# Patient Record
Sex: MALE | Race: BLACK OR AFRICAN AMERICAN | Employment: UNEMPLOYED | URBAN - METROPOLITAN AREA
[De-identification: names, ages, dates, MRNs, and addresses within clinical notes are randomized per-mention and may not be internally consistent; named-entity substitution may affect disease eponyms.]

---

## 2023-01-18 ENCOUNTER — HOSPITAL ENCOUNTER (EMERGENCY)
Age: 38
Discharge: HOME | End: 2023-01-18
Attending: EMERGENCY MEDICINE
Payer: MEDICARE

## 2023-01-18 VITALS
RESPIRATION RATE: 20 BRPM | TEMPERATURE: 97.7 F | HEIGHT: 69 IN | DIASTOLIC BLOOD PRESSURE: 78 MMHG | BODY MASS INDEX: 28.28 KG/M2 | SYSTOLIC BLOOD PRESSURE: 144 MMHG | OXYGEN SATURATION: 98 % | HEART RATE: 72 BPM | WEIGHT: 190.92 LBS

## 2023-01-18 DIAGNOSIS — H65.01 NON-RECURRENT ACUTE SEROUS OTITIS MEDIA OF RIGHT EAR: Primary | ICD-10-CM

## 2023-01-18 PROCEDURE — 6370000000 HC RX 637 (ALT 250 FOR IP): Performed by: EMERGENCY MEDICINE

## 2023-01-18 PROCEDURE — 99283 EMERGENCY DEPT VISIT LOW MDM: CPT

## 2023-01-18 RX ORDER — AZITHROMYCIN 500 MG/1
500 TABLET, FILM COATED ORAL ONCE
Status: COMPLETED | OUTPATIENT
Start: 2023-01-18 | End: 2023-01-18

## 2023-01-18 RX ORDER — AZITHROMYCIN 500 MG/1
500 TABLET, FILM COATED ORAL DAILY
Qty: 5 TABLET | Refills: 0 | Status: SHIPPED | OUTPATIENT
Start: 2023-01-18 | End: 2023-01-23

## 2023-01-18 RX ADMIN — AZITHROMYCIN MONOHYDRATE 500 MG: 500 TABLET ORAL at 16:35

## 2023-01-18 ASSESSMENT — ENCOUNTER SYMPTOMS
VOICE CHANGE: 0
WHEEZING: 0
TROUBLE SWALLOWING: 0
SHORTNESS OF BREATH: 0

## 2023-01-18 NOTE — ED NOTES
Patient presents to ED with R ear fullness, states that he was placing cotton in his R ear to dry and drown out noise while he slept. Denies pain, but states he can barely hear.       Radha Aparicio RN  01/18/23 7886

## 2023-01-18 NOTE — ED PROVIDER NOTES
[id number] [facility]  eMERGENCY dEPARTMENT eNCOUnter      Pt Name: Tucker Mitchell  MRN: [id number]  [place] 1985  Date of evaluation: 1/18/2023  Provider: Barney Hedrick MD    [address] [place] [address]       Chief Complaint   Patient presents with    Ear Fullness         HISTORY OF PRESENT ILLNESS   (Location/Symptom, Timing/Onset, Context/Setting, Quality, Duration, Modifying Factors, Severity)  Note limiting factors. History obtained from: the patient    Tucker Mitchell is a 40 y.o. male who denies any significant past medical history reports 1 week of bilateral ear pain worse on the right than the left. Patient has any fever cough runny nose or other symptoms. Patient symptoms are moderate constant worsening denies any known aggravating or alleviating factors. Patient does report he feels like his ears are clogged and has mildly decreased hearing although hearing is not absent. HPI    Nursing Notes were reviewed. REVIEW OFSYSTEMS    (2-9 systems for level 4, 10 or more for level 5)     Review of Systems   Constitutional:  Negative for fever. HENT:  Positive for ear pain. Negative for drooling, trouble swallowing and voice change. Eyes:  Negative for visual disturbance. Respiratory:  Negative for shortness of breath and wheezing. Cardiovascular:  Negative for chest pain and palpitations. Neurological:  Negative for seizures and syncope. Psychiatric/Behavioral:  Negative for self-injury and suicidal ideas. Except as noted above the remainder of the review of systems was reviewed and negative. PAST MEDICAL HISTORY   No past medical history on file. SURGICAL HISTORY     No past surgical history on file. CURRENT MEDICATIONS       Previous Medications    No medications on file       ALLERGIES     Patient has no known allergies. FAMILY HISTORY     No family history on file.        SOCIAL HISTORY       Social History     Socioeconomic History    Marital status: Single         PHYSICAL EXAM    (up to 7 for level 4, 8 or more for level 5)     ED Triage Vitals [01/18/23 1519]   BP Temp Temp Source Heart Rate Resp SpO2 Height Weight   (!) 144/78 97.7 °F (36.5 °C) Oral 72 20 98 % 5' 9\" (1.753 m) 190 lb 14.7 oz (86.6 kg)       Physical Exam  Vitals and nursing note reviewed. Constitutional:       General: He is not in acute distress. Appearance: He is well-developed. HENT:      Head: Normocephalic and atraumatic. Right Ear: There is no impacted cerumen. Left Ear: Tympanic membrane, ear canal and external ear normal. There is no impacted cerumen. Ears:      Comments: Left external ear canal and TM normal.  Right external ear canal with mild erythema and TM bulging erythematous but no perforation. Eyes:      Conjunctiva/sclera: Conjunctivae normal.   Neck:      Vascular: No JVD. Trachea: No tracheal deviation. Cardiovascular:      Rate and Rhythm: Normal rate. Pulmonary:      Effort: Pulmonary effort is normal. No respiratory distress. Skin:     General: Skin is warm and dry. Neurological:      Mental Status: He is alert. EMERGENCY DEPARTMENT COURSE and DIFFERENTIAL DIAGNOSIS/MDM:   Vitals:    Vitals:    01/18/23 1519   BP: (!) 144/78   Pulse: 72   Resp: 20   Temp: 97.7 °F (36.5 °C)   TempSrc: Oral   SpO2: 98%   Weight: 190 lb 14.7 oz (86.6 kg)   Height: 5' 9\" (1.753 m)       Patient was given the following medications:  Medications   azithromycin (ZITHROMAX) tablet 500 mg (has no administration in time range)           CC/HPI Summary, DDx, ED Course, Reassessment, Disposition Considerations (include Tests not done, Admit vs D/C, Shared Decision Making, Pt Expectation of Test or Tx.):  I feel the patient likely has acute otitis media.  There is no evidence of perforation on exam. Ddx includes otitis externa, trauma, foreign body, herpes zoster oticus, chronic otitis media, mastoiditis however these are less consistent with history and physical and I feel the diagnosis of acute otitis media is much more likely. I considered serum labs and will get imaging such as CT at this time however do not feel this is likely. Patient reports an allergy to penicillin and therefore I prescribed him azithromycin. Patient advised to follow-up with primary care provider. Patient in agreement with plan. Strict return precautions given. The parents expressed understanding and agreement with this plan and the patient was discharged home. FINAL IMPRESSION      1. Non-recurrent acute serous otitis media of right ear          DISPOSITION/PLAN     DISPOSITION Decision To Discharge 01/18/2023 04:29:28 PM      PATIENT REFERRED TO:  Jing Chapman  [phone]  In 1 week  Ask for an appointment with a primary care doctor    [place] [address]  [phone]    If symptoms worsen      DISCHARGE MEDICATIONS:  New Prescriptions    AZITHROMYCIN (ZITHROMAX) 500 MG TABLET    Take 1 tablet by mouth daily for 5 days              (Please note that portions of this note were completed with a voice recognition program.  Efforts were made to edit the dictations but occasionally words are mis-transcribed. )    Fatoumata Melendez MD (electronically signed)  Attending Emergency Physician           Fatoumata Melendez MD  01/18/23 8421

## 2023-01-31 DIAGNOSIS — H91.90 HEARING LOSS, UNSPECIFIED HEARING LOSS TYPE, UNSPECIFIED LATERALITY: Primary | ICD-10-CM

## 2023-02-13 ENCOUNTER — PROCEDURE VISIT (OUTPATIENT)
Dept: AUDIOLOGY | Age: 38
End: 2023-02-13

## 2023-02-13 ENCOUNTER — OFFICE VISIT (OUTPATIENT)
Dept: ENT CLINIC | Age: 38
End: 2023-02-13
Payer: MEDICAID

## 2023-02-13 VITALS
HEART RATE: 88 BPM | SYSTOLIC BLOOD PRESSURE: 126 MMHG | BODY MASS INDEX: 28.19 KG/M2 | OXYGEN SATURATION: 96 % | HEIGHT: 69 IN | DIASTOLIC BLOOD PRESSURE: 77 MMHG

## 2023-02-13 DIAGNOSIS — H61.21 IMPACTED CERUMEN OF RIGHT EAR: ICD-10-CM

## 2023-02-13 DIAGNOSIS — H91.91 HEARING LOSS OF RIGHT EAR, UNSPECIFIED HEARING LOSS TYPE: Primary | ICD-10-CM

## 2023-02-13 DIAGNOSIS — H92.02 LEFT EAR PAIN: Primary | ICD-10-CM

## 2023-02-13 PROCEDURE — 99203 OFFICE O/P NEW LOW 30 MIN: CPT | Performed by: OTOLARYNGOLOGY

## 2023-02-13 PROCEDURE — 99999 PR OFFICE/OUTPT VISIT,PROCEDURE ONLY: CPT | Performed by: AUDIOLOGIST

## 2023-02-13 PROCEDURE — 69210 REMOVE IMPACTED EAR WAX UNI: CPT | Performed by: OTOLARYNGOLOGY

## 2023-02-13 NOTE — PATIENT INSTRUCTIONS
Good Communication Strategies    Communication can be challenging for anyone, but can be especially difficult for those with some degree of hearing loss. While we may not be able to control every factor that may lead to difficulty with communication, there are Good Communication Strategies that we can all use in our day-to-day lives. Communication takes both parties working together for it to be successful. Tips as a Listener:   Control your environment. It is important to limit the amount of background noise in the room when possible. You should also consider having a good light source in the room to best see the other person. Ask for clarification. Instead of saying \"What?\", you can use parts of what you heard to make a new question. For example, if you heard the word \"Thursday\" but not the rest of the week, you may ask \"What was that about Thursday? \" or \"What did you want to do Thursday? \". This shows the person talking that you are listening and will help them better explain what they are saying. Be an advocate for yourself. If you are hearing but not understanding, tell the other person \"I can hear you, but I need you to slow down when you speak. \"  Or if someone is facing the other direction, say \"I cannot hear you when you are not looking at me when we talk. \"       Tips as a Talker:   - Sit or stand 3 to 6 feet away to maximize audibility         -- It is unrealistic to believe someone else will fully hear your message if you are speaking from across the room or in a different room in the house   - Stay at eye level to help with visual cues   - Make sure you have the persons attention before speaking   - Use facial expressions and gestures to accentuate your message   - Raise your voice slightly (do not scream)   - Speak slowly and distinctly   - Use short, simple sentences   - Rephrase your words if the person is having a hard time understanding you    - To avoid distortion, dont speak directly into a persons ear      Some additional items that may be helpful:   - Remain patient - this is important for both parties   - Write down items that still cannot be heard/understood. You may write with pen/paper or consider typing/texting on a cell phone or smart device. - If background noise is unavoidable, try to keep yourself in a good position in the room. By sitting at a whalen on the side of the restaurant (preferably a corner), it will be easier to communicate than if you were sitting at a table in the middle with background noise surrounding you. Keep yourself positioned away from music speakers or heavy foot traffic.

## 2023-02-13 NOTE — PROGRESS NOTES
[facility]      Patient Name: Emily Gibson  Medical Record Number:  [id number]  Primary Care Physician:  No primary care provider on file. Date of Consultation: 2/13/2023    Chief Complaint: Hearing loss        HISTORY OF PRESENT ILLNESS  Boone Mcneill is a(n) 40 y.o. male who presents for evaluation of right-sided hearing loss. The patient says that he felt as though his hearing went down on the right side several weeks ago. He was given antibiotics, but the hearing did not come back. He does not have a significant history of ear problems. He is never had ear surgery. No other complaints today. REVIEW OF SYSTEMS  As above    PHYSICAL EXAM  GENERAL: No Acute Distress, Alert and Oriented, no Hoarseness, strong voice  EYES: EOMI, Anti-icteric  HENT:   Head: Normocephalic and atraumatic. Face:  Symmetric, facial nerve intact, no sinus tenderness  Ears: See below  Mouth/Oral Cavity:  normal lips, Uvula is midline, no mucosal lesions  Oropharynx/Larynx:  normal oropharynx,  Nose:Normal external nasal appearance. PROCEDURE  Bilateral ear exam with cerumen removal  The right ear was visualized binoculars scope. He had a dense impaction of cerumen pushed against the tympanic membrane that I removed with a Bobo suction. There was small amount left anteriorly as it was too uncomfortable to remove. Tympanic membrane was intact and aerated middle ear    On the left side tympanic membrane intact and aerated middle ear. Bae was midline. ASSESSMENT/PLAN  1. Hearing loss of right ear, unspecified hearing loss type  I believe that the hearing loss is from the cerumen impaction. If he still has hearing loss I told him to follow-up for an audiogram.    2. Impacted cerumen of right ear  Removed today in clinic. I did have to leave a little bit anteriorly as it was too uncomfortable to remove.   I given him a round of eardrops to help with the remaining wax             I have performed a head and neck physical exam personally or was physically present during the key or critical portions of the service. This note was generated completely or in part utilizing Dragon dictation speech recognition software. Occasionally, words are mistranscribed and despite editing, the text may contain inaccuracies due to incorrect word recognition. If further clarification is needed please contact the office at [phone].

## 2023-02-13 NOTE — PROGRESS NOTES
Lulu Whitfield   1985, 40 y.o. male   [id number]       Referring Provider: Samuel Carpenter MD  Referral Type: In an order in [address] [place] [address]    Reason for Visit: Evaluation of suspected change in hearing, tinnitus, or balance. ADULT AUDIOLOGIC EVALUATION      Lulu Whitfield is a 40 y.o. male seen today, 2/13/2023 , for an initial audiologic evaluation. Patient was seen by Samuel Carpenter MD following today's evaluation. Patient was accompanied by his partner. AUDIOLOGIC AND OTHER PERTINENT MEDICAL HISTORY:      Lulu Whitfield noted aural fullness and tinnitus. Patient reports January 2023 onset of right tinnitus and pressure with decreased hearing. Lulu Whitfield denied otalgia, otorrhea, dizziness, imbalance, history of falls, history of significant noise exposure, history of head trauma, history of ear surgery, and family history of hearing loss. Date: 2/13/2023     Otoscopy revealed: Clear ear canal, AS. Deep cerumen impaction, AD. Patient saw seen by Samuel Carpenter MD. I was deemed appropriate by Samuel Carpenter MD to cancel the audiogram today after ear cleaning. Patient to see audiology for audiologic evaluation should hearing change in the future.         Yayo Richey  Audiologist